# Patient Record
Sex: MALE | URBAN - METROPOLITAN AREA
[De-identification: names, ages, dates, MRNs, and addresses within clinical notes are randomized per-mention and may not be internally consistent; named-entity substitution may affect disease eponyms.]

---

## 2023-12-03 ENCOUNTER — LAB REQUISITION (OUTPATIENT)
Dept: LAB | Facility: HOSPITAL | Age: 23
End: 2023-12-03

## 2023-12-03 DIAGNOSIS — Z11.3 ENCOUNTER FOR SCREENING FOR INFECTIONS WITH A PREDOMINANTLY SEXUAL MODE OF TRANSMISSION: ICD-10-CM

## 2023-12-03 PROCEDURE — 87086 URINE CULTURE/COLONY COUNT: CPT

## 2023-12-03 PROCEDURE — 87661 TRICHOMONAS VAGINALIS AMPLIF: CPT

## 2023-12-03 PROCEDURE — 87800 DETECT AGNT MULT DNA DIREC: CPT

## 2023-12-05 LAB
BACTERIA UR CULT: NORMAL
C TRACH RRNA SPEC QL NAA+PROBE: NEGATIVE
N GONORRHOEA DNA SPEC QL PROBE+SIG AMP: NEGATIVE
T VAGINALIS RRNA SPEC QL NAA+PROBE: NEGATIVE

## 2024-05-14 ENCOUNTER — OFFICE VISIT (OUTPATIENT)
Dept: PRIMARY CARE CLINIC | Age: 24
End: 2024-05-14
Payer: COMMERCIAL

## 2024-05-14 VITALS
DIASTOLIC BLOOD PRESSURE: 86 MMHG | HEART RATE: 89 BPM | OXYGEN SATURATION: 99 % | SYSTOLIC BLOOD PRESSURE: 142 MMHG | RESPIRATION RATE: 18 BRPM | TEMPERATURE: 97.2 F | HEIGHT: 68 IN

## 2024-05-14 DIAGNOSIS — R10.32 LEFT LOWER QUADRANT ABDOMINAL PAIN: Primary | ICD-10-CM

## 2024-05-14 DIAGNOSIS — R11.0 NAUSEA: ICD-10-CM

## 2024-05-14 PROCEDURE — 99214 OFFICE O/P EST MOD 30 MIN: CPT

## 2024-05-14 NOTE — PROGRESS NOTES
Chief Complaint       Abdominal Pain      History of Present Illness   Source of history provided by:  patient and parent.      Sam Zarate III is a 23 y.o. old male presenting to the walk in clinic with his father for evaluation of severe abdominal pain which began 30 minutes prior to arrival in the office. Pain is located over the left lower quadrant of the abdomen. States the pain does not radiate. When asked if he is experiencing groin or scrotum pain, patient relays that he is unsure if he is feeling any pain in his groin or scrotum. Patient describes the pain as sharp, constant, and a 7/10. Reports associated nausea, but denies any vomiting or diarrhea. Has not tried taking anything OTC for symptomatic relief. Denies taking any other medications today. Denies ingesting any FB or caustic material. Denies any fever, chills, generalized body aches, ear pain, cough, dysphagia, loss of taste/smell, CP, SOB, dysuria, hematuria, change in stool color/consistency, constipation, melena, hematemesis, coffee-ground emesis, HA, sore throat, rash, or lethargy. Patient has not eaten today.     ROS    Unless otherwise stated in this report or unable to obtain because of the patient's clinical or mental status as evidenced by the medical record, this patients's positive and negative responses for Review of Systems, constitutional, psych, eyes, ENT, cardiovascular, respiratory, gastrointestinal, neurological, genitourinary, musculoskeletal, integument systems and systems related to the presenting problem are either stated in the preceding or were not pertinent or were negative for the symptoms and/or complaints related to the medical problem.     Past Medical History:  has no past medical history on file.  Past Surgical History:  has no past surgical history on file.  Social History:    Family History: family history is not on file.   Allergies: Patient has no known allergies.    Physical Exam         VS:  BP (!) 142/86

## 2025-06-16 ENCOUNTER — OFFICE VISIT (OUTPATIENT)
Dept: PRIMARY CARE CLINIC | Age: 25
End: 2025-06-16
Payer: COMMERCIAL

## 2025-06-16 VITALS
OXYGEN SATURATION: 98 % | BODY MASS INDEX: 26.16 KG/M2 | HEIGHT: 68 IN | WEIGHT: 172.6 LBS | RESPIRATION RATE: 16 BRPM | HEART RATE: 58 BPM | DIASTOLIC BLOOD PRESSURE: 68 MMHG | SYSTOLIC BLOOD PRESSURE: 128 MMHG | TEMPERATURE: 97.1 F

## 2025-06-16 DIAGNOSIS — K59.00 CONSTIPATION, UNSPECIFIED CONSTIPATION TYPE: ICD-10-CM

## 2025-06-16 DIAGNOSIS — L20.82 FLEXURAL ECZEMA: Primary | ICD-10-CM

## 2025-06-16 PROCEDURE — 99213 OFFICE O/P EST LOW 20 MIN: CPT | Performed by: PHYSICIAN ASSISTANT

## 2025-06-16 RX ORDER — MOMETASONE FUROATE 1 MG/G
CREAM TOPICAL
Qty: 45 G | Refills: 1 | Status: SHIPPED | OUTPATIENT
Start: 2025-06-16

## 2025-06-16 RX ORDER — METHYLPREDNISOLONE 4 MG/1
TABLET ORAL
Qty: 1 KIT | Refills: 0 | Status: SHIPPED | OUTPATIENT
Start: 2025-06-16 | End: 2025-06-22

## 2025-06-16 SDOH — ECONOMIC STABILITY: FOOD INSECURITY: WITHIN THE PAST 12 MONTHS, THE FOOD YOU BOUGHT JUST DIDN'T LAST AND YOU DIDN'T HAVE MONEY TO GET MORE.: NEVER TRUE

## 2025-06-16 SDOH — ECONOMIC STABILITY: FOOD INSECURITY: WITHIN THE PAST 12 MONTHS, YOU WORRIED THAT YOUR FOOD WOULD RUN OUT BEFORE YOU GOT MONEY TO BUY MORE.: NEVER TRUE

## 2025-06-16 ASSESSMENT — PATIENT HEALTH QUESTIONNAIRE - PHQ9
2. FEELING DOWN, DEPRESSED OR HOPELESS: NOT AT ALL
SUM OF ALL RESPONSES TO PHQ QUESTIONS 1-9: 0
1. LITTLE INTEREST OR PLEASURE IN DOING THINGS: NOT AT ALL
SUM OF ALL RESPONSES TO PHQ QUESTIONS 1-9: 0

## 2025-06-16 NOTE — PROGRESS NOTES
Chief Complaint   Eczema (Lifelong, worsening over past few weeks, mometasone) and Constipation (Started a few months ago, small amounts regularly, cramping, occasional nausea)      History of Present Illness   Source of history provided by:  patient.      Sam Zarate III is a 24 y.o. old male presenting to the walk in clinic for evaluation of an eczema flare. Patient reports that he has had a lifelong issue with eczema but he has had a flare over the past few weeks.  He previously had a prescription for mometasone topical cream which he was using with relief of symptoms but he recently ran out of this medication and is requesting a refill today.  Denies any obvious triggers for his current exacerbation. Since onset the symptoms have progressed. Reports associated erythema, mild burning, and pruritis. Denies any drainage. Denies any lymphangitic streaking, fever, chills, HA , dyspnea, dysphagia, recent illness, myalgias, vomiting, or lethargy.     Patient is also complaining of constipation for the past few months.  He has tried daily MiraLAX without relief of symptoms.  Patient reports occasional straining with bowel movements and generalized abdominal cramping.  Reports occasional nausea but denies any vomiting episodes.  Denies any bloody stools.  Patient reports that he continues to produce bowel movements but only small amounts daily.     ROS    Unless otherwise stated in this report or unable to obtain because of the patient's clinical or mental status as evidenced by the medical record, this patients's positive and negative responses for Review of Systems, constitutional, psych, eyes, ENT, cardiovascular, respiratory, gastrointestinal, neurological, genitourinary, musculoskeletal, integument systems and systems related to the presenting problem are either stated in the preceding or were not pertinent or were negative for the symptoms and/or complaints related to the medical problem.    Past Medical

## 2025-06-24 ENCOUNTER — OFFICE VISIT (OUTPATIENT)
Dept: PRIMARY CARE CLINIC | Age: 25
End: 2025-06-24
Payer: COMMERCIAL

## 2025-06-24 VITALS
SYSTOLIC BLOOD PRESSURE: 100 MMHG | TEMPERATURE: 97.6 F | HEIGHT: 68 IN | OXYGEN SATURATION: 97 % | HEART RATE: 81 BPM | WEIGHT: 174 LBS | DIASTOLIC BLOOD PRESSURE: 78 MMHG | BODY MASS INDEX: 26.37 KG/M2

## 2025-06-24 DIAGNOSIS — Z01.89 ROUTINE LAB DRAW: ICD-10-CM

## 2025-06-24 DIAGNOSIS — Z13.29 THYROID DISORDER SCREEN: ICD-10-CM

## 2025-06-24 DIAGNOSIS — K59.04 CHRONIC IDIOPATHIC CONSTIPATION: ICD-10-CM

## 2025-06-24 DIAGNOSIS — Z13.1 DIABETES MELLITUS SCREENING: ICD-10-CM

## 2025-06-24 DIAGNOSIS — Z13.21 ENCOUNTER FOR VITAMIN DEFICIENCY SCREENING: Primary | ICD-10-CM

## 2025-06-24 DIAGNOSIS — Z56.9 WORK ENVIRONMENT ADVERSE EFFECT: ICD-10-CM

## 2025-06-24 DIAGNOSIS — Z13.220 LIPID SCREENING: ICD-10-CM

## 2025-06-24 PROCEDURE — 99214 OFFICE O/P EST MOD 30 MIN: CPT | Performed by: NURSE PRACTITIONER

## 2025-06-24 SDOH — ECONOMIC STABILITY - INCOME SECURITY: UNSPECIFIED PROBLEMS RELATED TO EMPLOYMENT: Z56.9

## 2025-06-24 ASSESSMENT — PATIENT HEALTH QUESTIONNAIRE - PHQ9
1. LITTLE INTEREST OR PLEASURE IN DOING THINGS: NOT AT ALL
SUM OF ALL RESPONSES TO PHQ QUESTIONS 1-9: 0
2. FEELING DOWN, DEPRESSED OR HOPELESS: NOT AT ALL

## 2025-06-24 ASSESSMENT — LIFESTYLE VARIABLES
HOW MANY STANDARD DRINKS CONTAINING ALCOHOL DO YOU HAVE ON A TYPICAL DAY: 1 OR 2
HOW OFTEN DO YOU HAVE A DRINK CONTAINING ALCOHOL: MONTHLY OR LESS

## 2025-06-24 NOTE — PROGRESS NOTES
25  Sam Zarate III : 2000 Sex: male  Age: 24 y.o.    Chief Complaint   Patient presents with    [facility]     Just need a provider. Will be scheduling with Doctor Trae.          History of Present Illness  The patient is a 24-year-old male who presents for a new patient setup.    He reports no current health issues but has some underlying concerns that he plans to address in the future. He is not experiencing any headaches, dizziness, ear problems, difficulty chewing or swallowing, sore throat, cough, shortness of breath, chest pain, palpitations, nausea, or vomiting. He also reports no abnormal swelling in his legs. He has no history of high blood pressure, thyroid issues, or diabetes. He has not noticed any hernias and reports no groin pain.    He continues to experience constipation, which he attributes to poor dietary habits. He was prescribed milk of magnesia for a few days, but the constipation recurred after discontinuation, prompting him to resume the medication.    He has observed a slight enlargement in one of his testicles over the past week, which is not associated with pain. He also reports an increase in size of the area beneath his testicles over the past few months.    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    He had a consultation approximately 1 to 2 weeks ago due to constipation and an eczema flare-up, both of which have since been resolved.    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    Review of Systems      Current Outpatient Medications:     mometasone (ELOCON) 0.1 % cream, Apply topically BID prn for no more than 2 weeks. Do not apply to face or genitals., Disp: 45 g, Rfl: 1  No Known Allergies    No past medical history on file.  No past surgical history on file.  History reviewed. No pertinent family history.  Social History     Socioeconomic History    Marital status: